# Patient Record
Sex: FEMALE | Race: WHITE | NOT HISPANIC OR LATINO | Employment: OTHER
[De-identification: names, ages, dates, MRNs, and addresses within clinical notes are randomized per-mention and may not be internally consistent; named-entity substitution may affect disease eponyms.]

---

## 2018-03-30 ENCOUNTER — RECORDS - HEALTHEAST (OUTPATIENT)
Dept: ADMINISTRATIVE | Facility: OTHER | Age: 73
End: 2018-03-30

## 2018-04-09 ENCOUNTER — OFFICE VISIT - HEALTHEAST (OUTPATIENT)
Dept: FAMILY MEDICINE | Facility: CLINIC | Age: 73
End: 2018-04-09

## 2018-04-09 ENCOUNTER — COMMUNICATION - HEALTHEAST (OUTPATIENT)
Dept: FAMILY MEDICINE | Facility: CLINIC | Age: 73
End: 2018-04-09

## 2018-04-09 DIAGNOSIS — C50.919 BREAST CANCER (H): ICD-10-CM

## 2018-04-09 DIAGNOSIS — E78.5 HYPERLIPIDEMIA: ICD-10-CM

## 2018-04-09 DIAGNOSIS — I10 HYPERTENSION: ICD-10-CM

## 2018-04-09 LAB
ALBUMIN SERPL-MCNC: 3.8 G/DL (ref 3.5–5)
ALP SERPL-CCNC: 117 U/L (ref 45–120)
ALT SERPL W P-5'-P-CCNC: 20 U/L (ref 0–45)
ANION GAP SERPL CALCULATED.3IONS-SCNC: 9 MMOL/L (ref 5–18)
AST SERPL W P-5'-P-CCNC: 20 U/L (ref 0–40)
BILIRUB SERPL-MCNC: 0.7 MG/DL (ref 0–1)
BUN SERPL-MCNC: 20 MG/DL (ref 8–28)
CALCIUM SERPL-MCNC: 10.2 MG/DL (ref 8.5–10.5)
CHLORIDE BLD-SCNC: 104 MMOL/L (ref 98–107)
CHOLEST SERPL-MCNC: 185 MG/DL
CO2 SERPL-SCNC: 26 MMOL/L (ref 22–31)
CREAT SERPL-MCNC: 1.18 MG/DL (ref 0.6–1.1)
ERYTHROCYTE [DISTWIDTH] IN BLOOD BY AUTOMATED COUNT: 12 % (ref 11–14.5)
FASTING STATUS PATIENT QL REPORTED: YES
GFR SERPL CREATININE-BSD FRML MDRD: 45 ML/MIN/1.73M2
GLUCOSE BLD-MCNC: 103 MG/DL (ref 70–125)
HBA1C MFR BLD: 5.3 % (ref 3.5–6)
HCT VFR BLD AUTO: 42.3 % (ref 35–47)
HDLC SERPL-MCNC: 49 MG/DL
HGB BLD-MCNC: 14.5 G/DL (ref 12–16)
LDLC SERPL CALC-MCNC: 93 MG/DL
MCH RBC QN AUTO: 31.3 PG (ref 27–34)
MCHC RBC AUTO-ENTMCNC: 34.2 G/DL (ref 32–36)
MCV RBC AUTO: 91 FL (ref 80–100)
PLATELET # BLD AUTO: 217 THOU/UL (ref 140–440)
PMV BLD AUTO: 7.8 FL (ref 7–10)
POTASSIUM BLD-SCNC: 4.9 MMOL/L (ref 3.5–5)
PROT SERPL-MCNC: 6.8 G/DL (ref 6–8)
RBC # BLD AUTO: 4.63 MILL/UL (ref 3.8–5.4)
SODIUM SERPL-SCNC: 139 MMOL/L (ref 136–145)
TRIGL SERPL-MCNC: 215 MG/DL
WBC: 6.7 THOU/UL (ref 4–11)

## 2018-04-09 ASSESSMENT — MIFFLIN-ST. JEOR: SCORE: 1522.61

## 2018-04-17 ENCOUNTER — COMMUNICATION - HEALTHEAST (OUTPATIENT)
Dept: FAMILY MEDICINE | Facility: CLINIC | Age: 73
End: 2018-04-17

## 2018-08-21 ENCOUNTER — COMMUNICATION - HEALTHEAST (OUTPATIENT)
Dept: FAMILY MEDICINE | Facility: CLINIC | Age: 73
End: 2018-08-21

## 2018-08-21 ENCOUNTER — RECORDS - HEALTHEAST (OUTPATIENT)
Dept: ADMINISTRATIVE | Facility: OTHER | Age: 73
End: 2018-08-21

## 2018-09-04 ENCOUNTER — HOSPITAL ENCOUNTER (OUTPATIENT)
Dept: MAMMOGRAPHY | Facility: CLINIC | Age: 73
Discharge: HOME | End: 2018-09-04
Attending: FAMILY MEDICINE

## 2018-09-04 ENCOUNTER — COMMUNICATION - HEALTHEAST (OUTPATIENT)
Dept: TELEHEALTH | Facility: CLINIC | Age: 73
End: 2018-09-04

## 2018-09-04 DIAGNOSIS — Z12.31 VISIT FOR SCREENING MAMMOGRAM: ICD-10-CM

## 2018-09-05 ENCOUNTER — OFFICE VISIT - HEALTHEAST (OUTPATIENT)
Dept: FAMILY MEDICINE | Facility: CLINIC | Age: 73
End: 2018-09-05

## 2018-09-05 DIAGNOSIS — H81.13 BENIGN PAROXYSMAL POSITIONAL VERTIGO DUE TO BILATERAL VESTIBULAR DISORDER: ICD-10-CM

## 2018-09-05 RX ORDER — MECLIZINE HYDROCHLORIDE 25 MG/1
25 TABLET ORAL 3 TIMES DAILY PRN
Qty: 30 TABLET | Refills: 1 | Status: SHIPPED | OUTPATIENT
Start: 2018-09-05

## 2018-09-05 ASSESSMENT — MIFFLIN-ST. JEOR: SCORE: 1578.18

## 2018-10-18 ENCOUNTER — COMMUNICATION - HEALTHEAST (OUTPATIENT)
Dept: FAMILY MEDICINE | Facility: CLINIC | Age: 73
End: 2018-10-18

## 2018-10-18 DIAGNOSIS — I10 HTN (HYPERTENSION): ICD-10-CM

## 2018-10-22 ENCOUNTER — COMMUNICATION - HEALTHEAST (OUTPATIENT)
Dept: FAMILY MEDICINE | Facility: CLINIC | Age: 73
End: 2018-10-22

## 2018-10-22 DIAGNOSIS — I10 HTN (HYPERTENSION): ICD-10-CM

## 2018-10-24 ENCOUNTER — COMMUNICATION - HEALTHEAST (OUTPATIENT)
Dept: FAMILY MEDICINE | Facility: CLINIC | Age: 73
End: 2018-10-24

## 2018-10-24 DIAGNOSIS — E78.5 HYPERLIPEMIA: ICD-10-CM

## 2019-04-16 ENCOUNTER — COMMUNICATION - HEALTHEAST (OUTPATIENT)
Dept: FAMILY MEDICINE | Facility: CLINIC | Age: 74
End: 2019-04-16

## 2019-04-16 ENCOUNTER — OFFICE VISIT - HEALTHEAST (OUTPATIENT)
Dept: FAMILY MEDICINE | Facility: CLINIC | Age: 74
End: 2019-04-16

## 2019-04-16 DIAGNOSIS — I10 HTN (HYPERTENSION): ICD-10-CM

## 2019-04-16 DIAGNOSIS — E78.5 HYPERLIPEMIA: ICD-10-CM

## 2019-04-16 DIAGNOSIS — Z00.00 WELLNESS EXAMINATION: ICD-10-CM

## 2019-04-16 LAB
ANION GAP SERPL CALCULATED.3IONS-SCNC: 9 MMOL/L (ref 5–18)
BUN SERPL-MCNC: 18 MG/DL (ref 8–28)
CALCIUM SERPL-MCNC: 10.2 MG/DL (ref 8.5–10.5)
CHLORIDE BLD-SCNC: 106 MMOL/L (ref 98–107)
CHOLEST SERPL-MCNC: 157 MG/DL
CO2 SERPL-SCNC: 26 MMOL/L (ref 22–31)
CREAT SERPL-MCNC: 1.23 MG/DL (ref 0.6–1.1)
ERYTHROCYTE [DISTWIDTH] IN BLOOD BY AUTOMATED COUNT: 12.7 % (ref 11–14.5)
FASTING STATUS PATIENT QL REPORTED: YES
GFR SERPL CREATININE-BSD FRML MDRD: 43 ML/MIN/1.73M2
GLUCOSE BLD-MCNC: 106 MG/DL (ref 70–125)
HBA1C MFR BLD: 5.7 % (ref 3.5–6)
HCT VFR BLD AUTO: 42.8 % (ref 35–47)
HDLC SERPL-MCNC: 42 MG/DL
HGB BLD-MCNC: 14.3 G/DL (ref 12–16)
LDLC SERPL CALC-MCNC: 78 MG/DL
MCH RBC QN AUTO: 31.5 PG (ref 27–34)
MCHC RBC AUTO-ENTMCNC: 33.3 G/DL (ref 32–36)
MCV RBC AUTO: 94 FL (ref 80–100)
PLATELET # BLD AUTO: 244 THOU/UL (ref 140–440)
PMV BLD AUTO: 8.1 FL (ref 7–10)
POTASSIUM BLD-SCNC: 5.1 MMOL/L (ref 3.5–5)
RBC # BLD AUTO: 4.53 MILL/UL (ref 3.8–5.4)
SODIUM SERPL-SCNC: 141 MMOL/L (ref 136–145)
TRIGL SERPL-MCNC: 184 MG/DL
WBC: 6.5 THOU/UL (ref 4–11)

## 2019-04-17 ENCOUNTER — COMMUNICATION - HEALTHEAST (OUTPATIENT)
Dept: FAMILY MEDICINE | Facility: CLINIC | Age: 74
End: 2019-04-17

## 2019-05-13 ENCOUNTER — COMMUNICATION - HEALTHEAST (OUTPATIENT)
Dept: FAMILY MEDICINE | Facility: CLINIC | Age: 74
End: 2019-05-13

## 2020-02-14 ENCOUNTER — RECORDS - HEALTHEAST (OUTPATIENT)
Dept: ADMINISTRATIVE | Facility: OTHER | Age: 75
End: 2020-02-14

## 2020-03-20 ENCOUNTER — COMMUNICATION - HEALTHEAST (OUTPATIENT)
Dept: FAMILY MEDICINE | Facility: CLINIC | Age: 75
End: 2020-03-20

## 2020-03-20 DIAGNOSIS — I10 HTN (HYPERTENSION): ICD-10-CM

## 2020-03-20 DIAGNOSIS — E78.5 HYPERLIPEMIA: ICD-10-CM

## 2020-03-24 ENCOUNTER — OFFICE VISIT - HEALTHEAST (OUTPATIENT)
Dept: FAMILY MEDICINE | Facility: CLINIC | Age: 75
End: 2020-03-24

## 2020-03-24 ENCOUNTER — COMMUNICATION - HEALTHEAST (OUTPATIENT)
Dept: FAMILY MEDICINE | Facility: CLINIC | Age: 75
End: 2020-03-24

## 2020-03-24 DIAGNOSIS — E78.5 HYPERLIPEMIA: ICD-10-CM

## 2020-03-24 DIAGNOSIS — I10 HTN (HYPERTENSION): ICD-10-CM

## 2020-03-24 DIAGNOSIS — Z00.00 WELLNESS EXAMINATION: ICD-10-CM

## 2020-03-24 DIAGNOSIS — E78.3 HYPERCHYLOMICRONEMIA: ICD-10-CM

## 2020-03-24 DIAGNOSIS — I15.9 SECONDARY HYPERTENSION: ICD-10-CM

## 2020-03-24 LAB
ANION GAP SERPL CALCULATED.3IONS-SCNC: 9 MMOL/L (ref 5–18)
BUN SERPL-MCNC: 19 MG/DL (ref 8–28)
CALCIUM SERPL-MCNC: 9.7 MG/DL (ref 8.5–10.5)
CHLORIDE BLD-SCNC: 105 MMOL/L (ref 98–107)
CHOLEST SERPL-MCNC: 179 MG/DL
CO2 SERPL-SCNC: 27 MMOL/L (ref 22–31)
CREAT SERPL-MCNC: 1.17 MG/DL (ref 0.6–1.1)
ERYTHROCYTE [DISTWIDTH] IN BLOOD BY AUTOMATED COUNT: 11.9 % (ref 11–14.5)
FASTING STATUS PATIENT QL REPORTED: YES
GFR SERPL CREATININE-BSD FRML MDRD: 45 ML/MIN/1.73M2
GLUCOSE BLD-MCNC: 96 MG/DL (ref 70–125)
HCT VFR BLD AUTO: 44.9 % (ref 35–47)
HDLC SERPL-MCNC: 49 MG/DL
HGB BLD-MCNC: 15 G/DL (ref 12–16)
LDLC SERPL CALC-MCNC: 92 MG/DL
MCH RBC QN AUTO: 30.7 PG (ref 27–34)
MCHC RBC AUTO-ENTMCNC: 33.4 G/DL (ref 32–36)
MCV RBC AUTO: 92 FL (ref 80–100)
PLATELET # BLD AUTO: 237 THOU/UL (ref 140–440)
PMV BLD AUTO: 8.3 FL (ref 7–10)
POTASSIUM BLD-SCNC: 5.4 MMOL/L (ref 3.5–5)
RBC # BLD AUTO: 4.87 MILL/UL (ref 3.8–5.4)
SODIUM SERPL-SCNC: 141 MMOL/L (ref 136–145)
TRIGL SERPL-MCNC: 191 MG/DL
WBC: 6.7 THOU/UL (ref 4–11)

## 2020-03-24 RX ORDER — METOPROLOL TARTRATE 50 MG
50 TABLET ORAL 2 TIMES DAILY
Qty: 180 TABLET | Refills: 3 | Status: SHIPPED | OUTPATIENT
Start: 2020-03-24

## 2020-03-24 RX ORDER — LOSARTAN POTASSIUM 100 MG/1
100 TABLET ORAL DAILY
Qty: 90 TABLET | Refills: 3 | Status: SHIPPED | OUTPATIENT
Start: 2020-03-24

## 2020-03-24 RX ORDER — ROSUVASTATIN CALCIUM 40 MG/1
40 TABLET, COATED ORAL DAILY
Qty: 90 TABLET | Refills: 3 | Status: SHIPPED | OUTPATIENT
Start: 2020-03-24

## 2020-03-24 ASSESSMENT — MIFFLIN-ST. JEOR: SCORE: 1563.43

## 2020-03-25 ENCOUNTER — COMMUNICATION - HEALTHEAST (OUTPATIENT)
Dept: FAMILY MEDICINE | Facility: CLINIC | Age: 75
End: 2020-03-25

## 2020-09-25 ENCOUNTER — RECORDS - HEALTHEAST (OUTPATIENT)
Dept: ADMINISTRATIVE | Facility: OTHER | Age: 75
End: 2020-09-25

## 2021-05-27 NOTE — PROGRESS NOTES
Chief Complaint   Patient presents with     Annual Wellness Visit     Px, fasting, c/o burning feeling in R hand, wakes her in the night.      Medication Management     Refills pended       HPI: Very pleasant 73-year-old female originally from [place], presents for recheck of her chronic medical issues including hypertension, and hyperlipidemia.  Colonoscopy and mammogram and immunizations are up-to-date.    ROS: No chest pain shortness of breath or bowel or bladder issues    SH:     He does not smoke and she is      Meds:  Yvette has a current medication list which includes the following prescription(s): calcium-vitamin d, losartan, magnesium oxide, meclizine, metoprolol tartrate, and rosuvastatin.    O:  /80   Pulse 84   Resp 16   Wt (!) 252 lb (114.3 kg)   SpO2 96%   BMI 47.61 kg/m     Constitutional:    --Vitals as above  --No acute distress  Eyes-  --Sclera noninjected  --Lids and conjunctiva normal  ENT-  --TMs clear  --Sclera noninjected  --Pharynx not erythematous  Neck-  --Neck supple with no cervical lymphadenopathy  Lungs-  --Clear to Auscultation  Heart-  --Regular rate and rhythm  Abdomen--  --Soft, non-tender, non-distended  Skin-  --Pink and dry  Psych-  --Alert and oriented  --Normal affect      A/P:   1. Wellness examination  Reviewed colonoscopy, immunizations, recommended dental exams annually, reviewed mammogram and she is punctual about getting them given her past history of breast cancer.  - HM2(CBC w/o Differential)  - Basic Metabolic Panel  - Lipid Cascade  - Glycosylated Hemoglobin A1c      2.  Hypertension  -Stable  -Continue current medicines    3. Hyperlipidemia  -Stable  -Continue Crestor    See back in 1 year

## 2021-05-28 NOTE — TELEPHONE ENCOUNTER
Received a fax from [facility].    Fax is in Dr Schultz's inbox.    5/13/2019

## 2021-06-01 VITALS — HEIGHT: 61 IN | BODY MASS INDEX: 45.17 KG/M2 | WEIGHT: 239.25 LBS

## 2021-06-01 VITALS — WEIGHT: 251.5 LBS | HEIGHT: 61 IN | BODY MASS INDEX: 47.48 KG/M2

## 2021-06-02 VITALS — BODY MASS INDEX: 47.61 KG/M2 | WEIGHT: 252 LBS

## 2021-06-04 VITALS
HEIGHT: 61 IN | SYSTOLIC BLOOD PRESSURE: 124 MMHG | RESPIRATION RATE: 16 BRPM | DIASTOLIC BLOOD PRESSURE: 84 MMHG | HEART RATE: 77 BPM | WEIGHT: 248.25 LBS | OXYGEN SATURATION: 95 % | BODY MASS INDEX: 46.87 KG/M2

## 2021-06-07 NOTE — PROGRESS NOTES
"Chief Complaint   Patient presents with     Medication Management       HPI: Yvette presents today for multiple medical issues.  The first is hypertension.  She continues to take her metoprolol and losartan without complications.  Occasional minimal edema at the end of the day otherwise no bree edema.    She is quite apprehensive about the coronavirus and is worried about becoming infected.  She is due for Pneumovax but would like to hold off immunizations due to the coronavirus concerns.    Her hyperlipidemia remained stable and she has no effects from the rosuvastatin.    ROS: No chest pain or shortness of breath.  Edema as above.    SH: Reviewed-see Snapshot for review.     FH: Reviewed-see Snapshot for review.    Meds:  Yvette has a current medication list which includes the following prescription(s): calcium-vitamin d, magnesium oxide, meclizine, losartan, metoprolol tartrate, and rosuvastatin.    O:  /84   Pulse 77   Resp 16   Ht 5' 1\" (1.549 m)   Wt (!) 248 lb 4 oz (112.6 kg)   SpO2 95%   BMI 46.91 kg/m    Constitutional:    --Vitals as above  --No acute distress  Eyes-  --Sclera noninjected  --Lids and conjunctiva normal  ENT-  --TMs clear  --Sclera noninjected  --Pharynx not erythematous  Neck-  --Neck supple    Lymph-  --No cervical lymphadenopathy  Lungs-  --Clear to Auscultation  Heart-  --Regular rate and rhythm  Skin-  --Pink and dry    A/P:   1. Wellness examination  - HM2(CBC w/o Differential)    2. Secondary hypertension  Continue losartan and metoprolol  - Basic Metabolic Panel    3. Hyperchylomicronemia  Continue statin  - Lipid Cascade    4. HTN (hypertension)  - losartan (COZAAR) 100 MG tablet; Take 1 tablet (100 mg total) by mouth daily.  Dispense: 90 tablet; Refill: 3  - metoprolol tartrate (LOPRESSOR) 50 MG tablet; Take 1 tablet (50 mg total) by mouth 2 (two) times a day.  Dispense: 180 tablet; Refill: 3    ADDENDUM:  K+ elevated.  Recommended recheck and pt refused due to worry " about coronovirus.  I placed future order and pt agreed to come in when concerns diminished.    RTC 1 year

## 2021-06-17 NOTE — PROGRESS NOTES
"Chief Complaint   Patient presents with     Annual physical     Needs refills, mastectomy bra needed(futer need)       HPI: Very pleasant 72-year-old female presents today for annual exam.  She is a new patient and just moved here from [facility].  She has a history of breast cancer 30 years ago and gets her mammograms yearly.  She has hypertension which is stable, and hyperlipidemia which is stable on Crestor.  She has not had a Pap smear in many years and does not wish to have one.  Colonoscopy was updated as of 2 years ago.    ROS: All 12 systems reviewed and are negative.    SH: She is  and they have 3 grown sons who live in this area which is why they moved back.  He does not smoke.    FH: Family history of hypertension heart disease and cancer    Meds:  Yvette has a current medication list which includes the following prescription(s): calcium-vitamin d, magnesium oxide, losartan, metoprolol tartrate, and rosuvastatin.    O:  /78  Pulse 82  Temp 99  F (37.2  C)  Resp 18  Ht 5' 1\" (1.549 m)  Wt (!) 239 lb 4 oz (108.5 kg)  SpO2 96%  BMI 45.21 kg/m2   Constitutional:    --Vitals as above  --No acute distress  Eyes-  --Sclera noninjected  --Lids and conjunctiva normal  ENT-  --TMs clear  --Sclera noninjected  --Pharynx not erythematous  Neck-  --Neck supple with no cervical lymphadenopathy  Lungs-  --Clear to Auscultation  Heart-  --Regular rate and rhythm  Abdomen--  --Soft, non-tender, non-distended  Skin-  --Pink and dry  Psych-  --Alert and oriented  --Normal affect      A/P:   1. Hypertension  -Refilled losartan and metoprolol    2. Hyperlipidemia  -Continue Crestor    3. Breast cancer  -Recommended yearly mammograms    Anticipatory guidance given.  Reviewed immunizations.  Patient will be seen back yearly.                                          "

## 2021-06-19 NOTE — LETTER
Letter by Marva Schultz MD at      Author: Marva Schultz MD Service: -- Author Type: --    Filed:  Encounter Date: 4/17/2019 Status: (Other)         Yvette Castaneda  [address] [address]            April 17, 2019        Dear Ms. Castaneda,        Below are the results from your recent visit:    Resulted Orders   HM2(CBC w/o Differential)   Result Value Ref Range    WBC 6.5 4.0 - 11.0 thou/uL    RBC 4.53 3.80 - 5.40 mill/uL    Hemoglobin 14.3 12.0 - 16.0 g/dL    Hematocrit 42.8 35.0 - 47.0 %    MCV 94 80 - 100 fL    MCH 31.5 27.0 - 34.0 pg    MCHC 33.3 32.0 - 36.0 g/dL    RDW 12.7 11.0 - 14.5 %    Platelets 244 140 - 440 thou/uL    MPV 8.1 7.0 - 10.0 fL   Basic Metabolic Panel   Result Value Ref Range    Sodium 141 136 - 145 mmol/L    Potassium 5.1 (H) 3.5 - 5.0 mmol/L    Chloride 106 98 - 107 mmol/L    CO2 26 22 - 31 mmol/L    Anion Gap, Calculation 9 5 - 18 mmol/L    Glucose 106 70 - 125 mg/dL    Calcium 10.2 8.5 - 10.5 mg/dL    BUN 18 8 - 28 mg/dL    Creatinine 1.23 (H) 0.60 - 1.10 mg/dL    GFR MDRD Af Amer 52 (L) >60 mL/min/1.73m2    GFR MDRD Non Af Amer 43 (L) >60 mL/min/1.73m2    Narrative    Fasting Glucose reference range is 70-99 mg/dL per  American Diabetes Association (ADA) guidelines.   Lipid Cascade   Result Value Ref Range    Cholesterol 157 <=199 mg/dL    Triglycerides 184 (H) <=149 mg/dL    HDL Cholesterol 42 (L) >=50 mg/dL    LDL Calculated 78 <=129 mg/dL    Patient Fasting > 8hrs? Yes    Glycosylated Hemoglobin A1c   Result Value Ref Range    Hemoglobin A1c 5.7 3.5 - 6.0 %         Yvette, your laboratory results look good.  Your potassium was slightly high at 5.1 but I am not worried about this.  Keep up the good work.  We should visit again in 1 year.    Sincerely,        DAYRON Schultz MD, HARRY  [facility]  [phone]

## 2021-06-20 NOTE — PROGRESS NOTES
DATE OF SERVICE: 09/05/2018    SUBJECTIVE:  A very pleasant 72-year-old female presents today with a 2-day history  of vertigo.  She feels like the room is spinning, particularly when she turns her  head in bed.  This has been going for 2 days.  There are no other symptoms.    REVIEW OF SYSTEMS:  Positive for slight rhinorrhea.  Negative for cough.  Negative  for fever, vomiting or diarrhea.    Socially, she is , does not smoke.    OBJECTIVE:  Examination shows 124/84, pulse 81, respirations 16.  HEENT shows TMs  clear, sclerae not injected, pharynx nonerythematous.  Neck is supple.  No  lymphadenopathy.  Lungs clear.  Heart is regular.  Skin:  Pink and dry.   Psychiatric:  Alert and oriented, conversant x3.    ASSESSMENT:    1.  Benign positional vertigo.  2.  Hypertension.    PLAN:  1.  Most likely BPV.  Will start Antivert 25 mg every 8 hours p.r.n. vertigo.  2.  Concerned about driving and I discussed with her concerns about driving and she  is going to have her  drive her until she is asymptomatic.  3.  Reviewed hypertension medications including her metoprolol, but I doubt that  this is causing her symptoms.  The patient will continue with her current  medications.      JESSIE HERNANDEZ MD  pa  JANET 09/05/2018 11:15:40  T 09/05/2018 11:40:42  R 09/05/2018 11:40:42  [id number]        cc:JESSIE HERNANDEZ MD

## 2021-06-20 NOTE — LETTER
Letter by Marva Schultz MD at      Author: Marva Schultz MD Service: -- Author Type: --    Filed:  Encounter Date: 3/25/2020 Status: (Other)         Yvette Castaneda  [address]  [place] [address]            March 25, 2020        Dear Ms. Castaneda,        Below are the results from your recent visit:    Resulted Orders   Basic Metabolic Panel   Result Value Ref Range    Sodium 141 136 - 145 mmol/L    Potassium 5.4 (H) 3.5 - 5.0 mmol/L    Chloride 105 98 - 107 mmol/L    CO2 27 22 - 31 mmol/L    Anion Gap, Calculation 9 5 - 18 mmol/L    Glucose 96 70 - 125 mg/dL    Calcium 9.7 8.5 - 10.5 mg/dL    BUN 19 8 - 28 mg/dL    Creatinine 1.17 (H) 0.60 - 1.10 mg/dL    GFR MDRD Af Amer 55 (L) >60 mL/min/1.73m2    GFR MDRD Non Af Amer 45 (L) >60 mL/min/1.73m2    Narrative    Fasting Glucose reference range is 70-99 mg/dL per  American Diabetes Association (ADA) guidelines.   Lipid Cascade   Result Value Ref Range    Cholesterol 179 <=199 mg/dL    Triglycerides 191 (H) <=149 mg/dL    HDL Cholesterol 49 (L) >=50 mg/dL    LDL Calculated 92 <=129 mg/dL    Patient Fasting > 8hrs? Yes    HM2(CBC w/o Differential)   Result Value Ref Range    WBC 6.7 4.0 - 11.0 thou/uL    RBC 4.87 3.80 - 5.40 mill/uL    Hemoglobin 15.0 12.0 - 16.0 g/dL    Hematocrit 44.9 35.0 - 47.0 %    MCV 92 80 - 100 fL    MCH 30.7 27.0 - 34.0 pg    MCHC 33.4 32.0 - 36.0 g/dL    RDW 11.9 11.0 - 14.5 %    Platelets 237 140 - 440 thou/uL    MPV 8.3 7.0 - 10.0 fL         Yvette, your laboratory results look good.  As we discussed on the phone, your potassium is slightly elevated.  I have placed a future order for you to come in and have it rechecked.  Fortunately, your renal function is actually improved from previous visits.    Thank you for coming in the visit.  We should visit again in 1 year.    Sincerely,        DAYRON Schultz MD, HARRY  [facility]  [phone]

## 2021-06-23 ENCOUNTER — RECORDS - HEALTHEAST (OUTPATIENT)
Dept: FAMILY MEDICINE | Facility: CLINIC | Age: 76
End: 2021-06-23

## 2021-06-26 NOTE — TELEPHONE ENCOUNTER
RN cannot approve Refill Request    RN can NOT refill this medication PCP messaged that patient is overdue for Labs and Office Visit. Last office visit: 3/24/2020 Marva Schultz MD Last Physical: 4/16/2019 Last MTM visit: Visit date not found Last visit same specialty: 3/24/2020 Marva Schultz MD.  Next visit within 3 mo: Visit date not found  Next physical within 3 mo: Visit date not found      Kathi Lara, [facility] Triage/Med Refill 6/23/2021    Requested Prescriptions   Pending Prescriptions Disp Refills     rosuvastatin (CRESTOR) 40 MG tablet [Pharmacy Med Name: Rosuvastatin Calcium Oral Tablet 40 MG] 90 tablet 0     Sig: TAKE 1 TABLET (40 MG TOTAL) BY MOUTH ONE TIME DAILY       Statins Refill Protocol (Hmg CoA Reductase Inhibitors) Failed - 6/22/2021 12:30 PM        Failed - PCP or prescribing provider visit in past 12 months      Last office visit with prescriber/PCP: 3/24/2020 Marva Schultz MD OR same dept: Visit date not found OR same specialty: 3/24/2020 Marva Schultz MD  Last physical: 4/16/2019 Last MTM visit: Visit date not found   Next visit within 3 mo: Visit date not found  Next physical within 3 mo: Visit date not found  Prescriber OR PCP: Marva Schultz MD  Last diagnosis associated with med order: 1. Hyperlipemia  - rosuvastatin (CRESTOR) 40 MG tablet [Pharmacy Med Name: Rosuvastatin Calcium Oral Tablet 40 MG]; TAKE 1 TABLET (40 MG TOTAL) BY MOUTH ONE TIME DAILY   Dispense: 90 tablet; Refill: 0    2. HTN (hypertension)  - losartan (COZAAR) 100 MG tablet [Pharmacy Med Name: Losartan Potassium Oral Tablet 100 MG]; TAKE 1 TABLET BY MOUTH ONE TIME DAILY   Dispense: 90 tablet; Refill: 0    If protocol passes may refill for 12 months if within 3 months of last provider visit (or a total of 15 months).                losartan (COZAAR) 100 MG tablet [Pharmacy Med Name: Losartan Potassium Oral Tablet 100 MG] 90 tablet 0     Sig: TAKE 1 TABLET BY MOUTH ONE  TIME DAILY       Angiotensin Receptor Blocker Protocol Failed - 6/22/2021 12:30 PM        Failed - PCP or prescribing provider visit in past 12 months       Last office visit with prescriber/PCP: 3/24/2020 Marva Schultz MD OR same dept: Visit date not found OR same specialty: 3/24/2020 Marva Schultz MD  Last physical: 4/16/2019 Last MTM visit: Visit date not found   Next visit within 3 mo: Visit date not found  Next physical within 3 mo: Visit date not found  Prescriber OR PCP: Marva Schultz MD  Last diagnosis associated with med order: 1. Hyperlipemia  - rosuvastatin (CRESTOR) 40 MG tablet [Pharmacy Med Name: Rosuvastatin Calcium Oral Tablet 40 MG]; TAKE 1 TABLET (40 MG TOTAL) BY MOUTH ONE TIME DAILY   Dispense: 90 tablet; Refill: 0    2. HTN (hypertension)  - losartan (COZAAR) 100 MG tablet [Pharmacy Med Name: Losartan Potassium Oral Tablet 100 MG]; TAKE 1 TABLET BY MOUTH ONE TIME DAILY   Dispense: 90 tablet; Refill: 0    If protocol passes may refill for 12 months if within 3 months of last provider visit (or a total of 15 months).             Failed - Serum potassium within the past 12 months     No results found for: LN-POTASSIUM          Failed - Blood pressure filed in past 12 months     BP Readings from Last 1 Encounters:   03/24/20 124/84             Failed - Serum creatinine within the past 12 months     Creatinine   Date Value Ref Range Status   03/24/2020 1.17 (H) 0.60 - 1.10 mg/dL Final

## 2021-06-26 NOTE — TELEPHONE ENCOUNTER
Pt has transferred care to [facility] in [place].     Baltazar Moser, [facility]

## 2021-07-03 NOTE — ADDENDUM NOTE
Addendum Note by Marva Schultz MD at 4/9/2018 10:01 AM     Author: Marva Schultz MD Service: -- Author Type: Physician    Filed: 4/9/2018 10:01 AM Encounter Date: 4/9/2018 Status: Signed    : Marva Schultz MD (Physician)    Addended by: MARVA SCHULTZ on: 4/9/2018 10:01 AM        Modules accepted: Orders

## 2021-07-10 ENCOUNTER — HEALTH MAINTENANCE LETTER (OUTPATIENT)
Age: 76
End: 2021-07-10

## 2021-08-10 ENCOUNTER — TRANSFERRED RECORDS (OUTPATIENT)
Dept: HEALTH INFORMATION MANAGEMENT | Facility: CLINIC | Age: 76
End: 2021-08-10

## 2021-09-04 ENCOUNTER — HEALTH MAINTENANCE LETTER (OUTPATIENT)
Age: 76
End: 2021-09-04

## 2021-09-27 ENCOUNTER — HOSPITAL ENCOUNTER (OUTPATIENT)
Dept: MAMMOGRAPHY | Facility: CLINIC | Age: 76
Discharge: HOME | End: 2021-09-27
Attending: FAMILY MEDICINE | Admitting: FAMILY MEDICINE
Payer: COMMERCIAL

## 2021-09-27 ENCOUNTER — HOSPITAL ENCOUNTER (OUTPATIENT)
Dept: ULTRASOUND IMAGING | Facility: CLINIC | Age: 76
Discharge: HOME | End: 2021-09-27
Attending: FAMILY MEDICINE | Admitting: FAMILY MEDICINE
Payer: COMMERCIAL

## 2021-09-27 DIAGNOSIS — R92.8 ABNORMAL MAMMOGRAM: ICD-10-CM

## 2021-09-27 PROCEDURE — 76642 ULTRASOUND BREAST LIMITED: CPT | Mod: LT

## 2021-09-27 PROCEDURE — 77061 BREAST TOMOSYNTHESIS UNI: CPT

## 2022-08-06 ENCOUNTER — HEALTH MAINTENANCE LETTER (OUTPATIENT)
Age: 77
End: 2022-08-06

## 2022-10-16 ENCOUNTER — HEALTH MAINTENANCE LETTER (OUTPATIENT)
Age: 77
End: 2022-10-16

## 2023-08-26 ENCOUNTER — HEALTH MAINTENANCE LETTER (OUTPATIENT)
Age: 78
End: 2023-08-26

## 2025-08-05 ENCOUNTER — LAB REQUISITION (OUTPATIENT)
Dept: LAB | Facility: CLINIC | Age: 80
End: 2025-08-05
Payer: COMMERCIAL

## 2025-08-05 DIAGNOSIS — Z11.1 ENCOUNTER FOR SCREENING FOR RESPIRATORY TUBERCULOSIS: ICD-10-CM

## 2025-08-06 ENCOUNTER — LAB REQUISITION (OUTPATIENT)
Dept: LAB | Facility: CLINIC | Age: 80
End: 2025-08-06
Payer: COMMERCIAL

## 2025-08-06 DIAGNOSIS — I12.9 HYPERTENSIVE CHRONIC KIDNEY DISEASE WITH STAGE 1 THROUGH STAGE 4 CHRONIC KIDNEY DISEASE, OR UNSPECIFIED CHRONIC KIDNEY DISEASE: ICD-10-CM

## 2025-08-06 PROCEDURE — P9604 ONE-WAY ALLOW PRORATED TRIP: HCPCS | Mod: ORL | Performed by: INTERNAL MEDICINE

## 2025-08-06 PROCEDURE — 36415 COLL VENOUS BLD VENIPUNCTURE: CPT | Mod: ORL | Performed by: INTERNAL MEDICINE

## 2025-08-06 PROCEDURE — 86481 TB AG RESPONSE T-CELL SUSP: CPT | Mod: ORL | Performed by: INTERNAL MEDICINE

## 2025-08-07 LAB
QUANTIFERON MITOGEN: 10 IU/ML
QUANTIFERON NIL TUBE: 0.1 IU/ML
QUANTIFERON TB1 TUBE: 0.13 IU/ML
QUANTIFERON TB2 TUBE: 0.1

## 2025-08-08 LAB
GAMMA INTERFERON BACKGROUND BLD IA-ACNC: 0.1 IU/ML
M TB IFN-G BLD-IMP: NEGATIVE
M TB IFN-G CD4+ BCKGRND COR BLD-ACNC: 9.9 IU/ML
MITOGEN IGNF BCKGRD COR BLD-ACNC: 0 IU/ML
MITOGEN IGNF BCKGRD COR BLD-ACNC: 0.03 IU/ML

## 2025-08-12 LAB
ANION GAP SERPL CALCULATED.3IONS-SCNC: 10 MMOL/L (ref 7–15)
BUN SERPL-MCNC: 21.3 MG/DL (ref 8–23)
CALCIUM SERPL-MCNC: 9.5 MG/DL (ref 8.8–10.4)
CHLORIDE SERPL-SCNC: 105 MMOL/L (ref 98–107)
CREAT SERPL-MCNC: 1.76 MG/DL (ref 0.51–0.95)
EGFRCR SERPLBLD CKD-EPI 2021: 29 ML/MIN/1.73M2
GLUCOSE SERPL-MCNC: 91 MG/DL (ref 70–99)
HCO3 SERPL-SCNC: 24 MMOL/L (ref 22–29)
POTASSIUM SERPL-SCNC: 4.7 MMOL/L (ref 3.4–5.3)
SODIUM SERPL-SCNC: 139 MMOL/L (ref 135–145)